# Patient Record
Sex: FEMALE | Race: WHITE | NOT HISPANIC OR LATINO | URBAN - METROPOLITAN AREA
[De-identification: names, ages, dates, MRNs, and addresses within clinical notes are randomized per-mention and may not be internally consistent; named-entity substitution may affect disease eponyms.]

---

## 2023-03-27 ENCOUNTER — LAB OUTSIDE AN ENCOUNTER (OUTPATIENT)
Dept: URBAN - METROPOLITAN AREA CLINIC 113 | Facility: CLINIC | Age: 28
End: 2023-03-27

## 2023-03-27 ENCOUNTER — OFFICE VISIT (OUTPATIENT)
Dept: URBAN - METROPOLITAN AREA CLINIC 113 | Facility: CLINIC | Age: 28
End: 2023-03-27
Payer: COMMERCIAL

## 2023-03-27 VITALS
SYSTOLIC BLOOD PRESSURE: 106 MMHG | HEART RATE: 69 BPM | DIASTOLIC BLOOD PRESSURE: 68 MMHG | WEIGHT: 154 LBS | RESPIRATION RATE: 18 BRPM | TEMPERATURE: 98 F

## 2023-03-27 DIAGNOSIS — R10.12 LEFT UPPER QUADRANT ABDOMINAL PAIN: ICD-10-CM

## 2023-03-27 DIAGNOSIS — K21.9 GERD: ICD-10-CM

## 2023-03-27 DIAGNOSIS — R19.4 CHANGE IN BOWEL HABITS: ICD-10-CM

## 2023-03-27 DIAGNOSIS — R13.10 DYSPHAGIA: ICD-10-CM

## 2023-03-27 PROCEDURE — 99204 OFFICE O/P NEW MOD 45 MIN: CPT | Performed by: INTERNAL MEDICINE

## 2023-03-27 RX ORDER — DICYCLOMINE HYDROCHLORIDE 20 MG/1
1 TABLET TABLET ORAL
Qty: 45 | Refills: 3 | OUTPATIENT
Start: 2023-03-27 | End: 2023-07-25

## 2023-03-27 NOTE — PHYSICAL EXAM SKIN:
no rashes , no suspicious lesions , no areas of discoloration , no jaundice present , good turgor , no masses , no tenderness on palpation 16-Mar-2023 03:43

## 2023-03-27 NOTE — HPI-TODAY'S VISIT:
Very pleasant 27-year-old female presents with a history of intermittent significant stabbing left upper quadrant abdominal discomfort that does not radiate.  She also has occasional episodes of esophageal chest pain.  Associate with dysphagia.  To solids and liquids.  Occasional mild heartburn.  Tums and Pepcid has been effective in controlling her symptoms.  Because of her symptoms she underwent a cholecystectomy at the [facility] about January 2023.  Unfortunately after removal of her gallbladder she has had persistent symptoms.  She did have a barium swallow done at the [facility] which was reportedly negative.  She is not having any nausea and vomiting at this time.  She had some diarrhea for a period of time after surgery but now this seems to be getting better.  1-2 stools per day.  No rectal bleeding or melena.  Interestingly, she is not having issues with gas or bloating at this time.  She did have gassiness in the past with omeprazole therapy. . Barium esophagram February 2023.  Unremarkable. . CT scan neck soft tissue without IV contrast.  Negative. . Brain MRI with IV contrast.  Normal January 2023. . Cholecystectomy January 2023.  Pathology notable for cholelithiasis and chronic cholecystitis. . Labs January 2023.  Normal liver test.  ALT 9.,  Hemoglobin 11.4.  MCV 88.  Platelet count 200,000. . EGD 2012.  Performed due to hematemesis related to opiate induced nausea vomiting.  She was taken opiates because of kidney stones.  This upper endoscopy was negative.

## 2023-05-05 ENCOUNTER — TELEPHONE ENCOUNTER (OUTPATIENT)
Dept: URBAN - METROPOLITAN AREA CLINIC 113 | Facility: CLINIC | Age: 28
End: 2023-05-05

## 2023-05-05 ENCOUNTER — WEB ENCOUNTER (OUTPATIENT)
Dept: URBAN - METROPOLITAN AREA SURGERY CENTER 25 | Facility: SURGERY CENTER | Age: 28
End: 2023-05-05

## 2023-05-05 ENCOUNTER — OFFICE VISIT (OUTPATIENT)
Dept: URBAN - METROPOLITAN AREA SURGERY CENTER 25 | Facility: SURGERY CENTER | Age: 28
End: 2023-05-05
Payer: COMMERCIAL

## 2023-05-05 DIAGNOSIS — K21.00 ALKALINE REFLUX ESOPHAGITIS: ICD-10-CM

## 2023-05-05 DIAGNOSIS — K29.60 ADENOPAPILLOMATOSIS GASTRICA: ICD-10-CM

## 2023-05-05 PROCEDURE — G8907 PT DOC NO EVENTS ON DISCHARG: HCPCS | Performed by: INTERNAL MEDICINE

## 2023-05-05 PROCEDURE — 43239 EGD BIOPSY SINGLE/MULTIPLE: CPT | Performed by: INTERNAL MEDICINE

## 2023-05-05 RX ORDER — FAMOTIDINE 40 MG/1
1 TABLET AT BEDTIME TABLET, FILM COATED ORAL ONCE A DAY
Qty: 90 | Refills: 3 | OUTPATIENT
Start: 2023-05-05

## 2023-05-05 RX ORDER — DICYCLOMINE HYDROCHLORIDE 20 MG/1
1 TABLET TABLET ORAL
Qty: 45 | Refills: 3 | Status: ACTIVE | COMMUNITY
Start: 2023-03-27 | End: 2023-07-25

## 2023-05-05 RX ORDER — PANTOPRAZOLE SODIUM 40 MG/1
1 TABLET TABLET, DELAYED RELEASE ORAL ONCE A DAY
Qty: 90 | Refills: 3 | OUTPATIENT
Start: 2023-05-05

## 2023-05-08 ENCOUNTER — TELEPHONE ENCOUNTER (OUTPATIENT)
Dept: URBAN - METROPOLITAN AREA CLINIC 113 | Facility: CLINIC | Age: 28
End: 2023-05-08

## 2023-06-27 ENCOUNTER — LAB OUTSIDE AN ENCOUNTER (OUTPATIENT)
Dept: URBAN - METROPOLITAN AREA CLINIC 113 | Facility: CLINIC | Age: 28
End: 2023-06-27

## 2023-06-27 ENCOUNTER — OFFICE VISIT (OUTPATIENT)
Dept: URBAN - METROPOLITAN AREA CLINIC 113 | Facility: CLINIC | Age: 28
End: 2023-06-27
Payer: COMMERCIAL

## 2023-06-27 VITALS
DIASTOLIC BLOOD PRESSURE: 59 MMHG | HEIGHT: 64 IN | SYSTOLIC BLOOD PRESSURE: 109 MMHG | HEART RATE: 107 BPM | BODY MASS INDEX: 26.63 KG/M2 | WEIGHT: 156 LBS | RESPIRATION RATE: 14 BRPM | TEMPERATURE: 97.8 F

## 2023-06-27 DIAGNOSIS — R10.12 LEFT UPPER QUADRANT ABDOMINAL PAIN: ICD-10-CM

## 2023-06-27 DIAGNOSIS — K31.84 GASTROPARESIS: ICD-10-CM

## 2023-06-27 DIAGNOSIS — K59.01 SLOW TRANSIT CONSTIPATION: ICD-10-CM

## 2023-06-27 DIAGNOSIS — R19.4 CHANGE IN BOWEL HABITS: ICD-10-CM

## 2023-06-27 DIAGNOSIS — K21.00 GASTROESOPHAGEAL REFLUX DISEASE WITH ESOPHAGITIS WITHOUT HEMORRHAGE: ICD-10-CM

## 2023-06-27 PROCEDURE — 99214 OFFICE O/P EST MOD 30 MIN: CPT | Performed by: NURSE PRACTITIONER

## 2023-06-27 RX ORDER — PANTOPRAZOLE SODIUM 40 MG/1
1 TABLET TABLET, DELAYED RELEASE ORAL ONCE A DAY
Qty: 90 | Refills: 3 | Status: ON HOLD | COMMUNITY
Start: 2023-05-05

## 2023-06-27 RX ORDER — DICYCLOMINE HYDROCHLORIDE 20 MG/1
1 TABLET TABLET ORAL
Qty: 45 | Refills: 3 | Status: ACTIVE | COMMUNITY
Start: 2023-03-27 | End: 2023-07-25

## 2023-06-27 RX ORDER — PRUCALOPRIDE 2 MG/1
1 TABLET TABLET, FILM COATED ORAL ONCE A DAY
Qty: 90 TABLET | Refills: 3 | OUTPATIENT
Start: 2023-06-27 | End: 2024-06-21

## 2023-06-27 RX ORDER — FAMOTIDINE 40 MG/1
1 TABLET AT BEDTIME TABLET, FILM COATED ORAL ONCE A DAY
Qty: 90 | Refills: 3 | Status: ON HOLD | COMMUNITY
Start: 2023-05-05

## 2023-06-27 NOTE — HPI-TODAY'S VISIT:
26yo female presenting for follow-up of abdominal pain. She was seen in the office in March for evaluation of abdominal pain and intermittent reflux symptoms/atypical chest pain, suspected to be related to functional dyspepsia. An EGD with possible esophageal dilation was planned, with consideration for initiation of acid suppressive therapy. Dicyclomine was provided to use as needed. EGD 5/5/23: moderate retained gastric contents suggestive of gastroparesis, two salmon-colored mucosal tongues noted from 37-38cm, erosive esophagitis class A limited to the GEJ, normal distal esophagus s/p biopsies, minimal antral erythema. Biopsies of the lower third of the esophagus and GE junction showed mild reflux esophagitis, negative for intestinal metaplasia or EoE. Antral biopsies showed reactive gastropathy, negative for H pylori.   She was recommended a trial of pantoprazole each morning and famotidine prior to bedtime, but never started the medication. She also has not tried the dicyclomine, as she had many questions about the medications. She reports a history of autoimmune disease, which was previously being worked up in [place]. She was misdiagnosed with lupus for a period of time, but is no longer under the care of a rheumatologist. She only has one functioning kidney, stating she experienced left kidney failure following a prolonged course of IV antibiotics at the age of 16 while in the . She has some calcification of the right kidney which leads to recurrent kidney stones. 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 She was previously following with a nephrologist at [facility], but was just being monitored.  Her symptoms are largely unchanged. She continues to experience intermittent exacerbations of upper abdominal pain, which is worsened in the left upper quadrant. This may be worsened on an empty stomach or after eating. She reports associated abdominal bloating and constipation. She has infrequent nausea without vomiting. She is having a bowel movement every 4 days with use of prune juice. She has tried fiber, miralax, suppositories, and stool softeners without relief. Since the time of her last visit, she has been modifying her diet in an effort to lessen her abdominal pain. She is consuming smaller meals, has increased her fluid intake, and is avoiding alcohol.

## 2023-06-27 NOTE — HPI-OTHER HISTORIES
Barium esophagram February 2023.  Unremarkable. . CT scan neck soft tissue without IV contrast.  Negative. . Brain MRI with IV contrast.  Normal January 2023. . Cholecystectomy January 2023.  Pathology notable for cholelithiasis and chronic cholecystitis. . Labs January 2023.  Normal liver test.  ALT 9.,  Hemoglobin 11.4.  MCV 88.  Platelet count 200,000. . EGD 2012.  Performed due to hematemesis related to opiate induced nausea vomiting.  She was taken opiates because of kidney stones.  This upper endoscopy was negative.

## 2023-06-28 ENCOUNTER — TELEPHONE ENCOUNTER (OUTPATIENT)
Dept: URBAN - METROPOLITAN AREA CLINIC 113 | Facility: CLINIC | Age: 28
End: 2023-06-28

## 2023-06-28 LAB
A/G RATIO: 1.8
ABSOLUTE BASOPHILS: 32
ABSOLUTE EOSINOPHILS: 101
ABSOLUTE LYMPHOCYTES: 1458
ABSOLUTE MONOCYTES: 302
ABSOLUTE NEUTROPHILS: 3408
ALBUMIN: 4.9
ALKALINE PHOSPHATASE: 82
ALT (SGPT): 11
AST (SGOT): 15
BASOPHILS: 0.6
BILIRUBIN, TOTAL: 0.5
BUN/CREATININE RATIO: (no result)
BUN: 9
CALCIUM: 9.8
CARBON DIOXIDE, TOTAL: 26
CHLORIDE: 104
CREATININE: 0.87
EGFR: 92
EOSINOPHILS: 1.9
GLOBULIN, TOTAL: 2.8
GLUCOSE: 80
HEMATOCRIT: 41.8
HEMOGLOBIN: 13.1
LIPASE: 29
LYMPHOCYTES: 27.5
MCH: 27.5
MCHC: 31.3
MCV: 87.6
MONOCYTES: 5.7
MPV: 10.4
NEUTROPHILS: 64.3
PLATELET COUNT: 269
POTASSIUM: 4
PROTEIN, TOTAL: 7.7
RDW: 12.2
RED BLOOD CELL COUNT: 4.77
SODIUM: 138
TSH W/REFLEX TO FT4: 1.76
WHITE BLOOD CELL COUNT: 5.3

## 2023-06-30 ENCOUNTER — TELEPHONE ENCOUNTER (OUTPATIENT)
Dept: URBAN - METROPOLITAN AREA CLINIC 113 | Facility: CLINIC | Age: 28
End: 2023-06-30

## 2023-07-19 ENCOUNTER — TELEPHONE ENCOUNTER (OUTPATIENT)
Dept: URBAN - METROPOLITAN AREA CLINIC 113 | Facility: CLINIC | Age: 28
End: 2023-07-19

## 2023-09-28 ENCOUNTER — OFFICE VISIT (OUTPATIENT)
Dept: URBAN - METROPOLITAN AREA CLINIC 113 | Facility: CLINIC | Age: 28
End: 2023-09-28

## 2023-11-16 ENCOUNTER — OFFICE VISIT (OUTPATIENT)
Dept: URBAN - METROPOLITAN AREA CLINIC 113 | Facility: CLINIC | Age: 28
End: 2023-11-16
Payer: COMMERCIAL

## 2023-11-16 VITALS
RESPIRATION RATE: 15 BRPM | HEIGHT: 64 IN | SYSTOLIC BLOOD PRESSURE: 123 MMHG | TEMPERATURE: 98.2 F | BODY MASS INDEX: 26.46 KG/M2 | HEART RATE: 61 BPM | WEIGHT: 155 LBS | DIASTOLIC BLOOD PRESSURE: 60 MMHG

## 2023-11-16 DIAGNOSIS — K59.01 SLOW TRANSIT CONSTIPATION: ICD-10-CM

## 2023-11-16 DIAGNOSIS — R10.12 LEFT UPPER QUADRANT ABDOMINAL PAIN: ICD-10-CM

## 2023-11-16 DIAGNOSIS — K31.84 GASTROPARESIS: ICD-10-CM

## 2023-11-16 DIAGNOSIS — K21.00 GASTROESOPHAGEAL REFLUX DISEASE WITH ESOPHAGITIS WITHOUT HEMORRHAGE: ICD-10-CM

## 2023-11-16 DIAGNOSIS — R19.4 CHANGE IN BOWEL HABITS: ICD-10-CM

## 2023-11-16 DIAGNOSIS — K21.9 GERD: ICD-10-CM

## 2023-11-16 DIAGNOSIS — R13.10 DYSPHAGIA: ICD-10-CM

## 2023-11-16 PROCEDURE — 99214 OFFICE O/P EST MOD 30 MIN: CPT | Performed by: INTERNAL MEDICINE

## 2023-11-16 NOTE — HPI-OTHER HISTORIES
CT neck soft tissue: Oct 2023. Mildly prominent cervical nodes. O/w normal.   CT abd and pelvis without contrast: July 2023. No acute findings  Motegrity: July 2023. No response to medication. Caused severe nausea and vomiting.  EGD 5/5/23: moderate retained gastric contents suggestive of gastroparesis, two salmon-colored mucosal tongues noted from 37-38cm, erosive esophagitis class A limited to the GEJ, normal distal esophagus s/p biopsies, minimal antral erythema. Biopsies of the lower third of the esophagus and GE junction showed mild reflux esophagitis, negative for intestinal metaplasia or EoE. Antral biopsies showed reactive gastropathy, negative for H pylori.  April 2023. Negative celiac serology  Barium esophagram February 2023.  Unremarkable. . CT scan neck soft tissue without IV contrast.  Negative. . Brain MRI with IV contrast.  Normal January 2023. . Cholecystectomy January 2023.  Pathology notable for cholelithiasis and chronic cholecystitis. . Labs January 2023.  Normal liver test.  ALT 9.,  Hemoglobin 11.4.  MCV 88.  Platelet count 200,000. . EGD 2012.  Performed due to hematemesis related to opiate induced nausea vomiting.  She was taken opiates because of kidney stones.  This upper endoscopy was negative.

## 2023-11-16 NOTE — HPI-TODAY'S VISIT:
27 yo female presenting for follow-up of abdominal pain.  She reports a history of autoimmune disease, which was previously being worked up in [place]. She was misdiagnosed with lupus for a period of time and is no longer under the care of a rheumatologist.   She only has one functioning kidney, stating she experienced left kidney failure following a prolonged course of IV antibiotics at the age of 16 while in the . She has some calcification of the right kidney which leads to recurrent kidney stones. 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 Hx of kidney trauma at age 16. She was previously following with a nephrologist at [facility], but was just being monitored. She states that her symptoms are better with a gastroparesis diet.  She is eating smaller more frequent meals.  We discussed previous imaging which was reassuring.  She has not had a gastric emptying study.  She is off of PPI therapy.  Not complaining of dysphagia heartburn or regurgitation at this time.  Appetite is fine.  Weight is stable.  Abdominal pain is clearly better.  No reports of nausea or vomiting.

## 2023-11-18 ENCOUNTER — DASHBOARD ENCOUNTERS (OUTPATIENT)
Age: 28
End: 2023-11-18

## 2024-06-11 ENCOUNTER — OFFICE VISIT (OUTPATIENT)
Dept: URBAN - METROPOLITAN AREA CLINIC 113 | Facility: CLINIC | Age: 29
End: 2024-06-11
Payer: COMMERCIAL

## 2024-06-11 ENCOUNTER — LAB OUTSIDE AN ENCOUNTER (OUTPATIENT)
Dept: URBAN - METROPOLITAN AREA CLINIC 113 | Facility: CLINIC | Age: 29
End: 2024-06-11

## 2024-06-11 VITALS
RESPIRATION RATE: 18 BRPM | WEIGHT: 148 LBS | HEIGHT: 64 IN | DIASTOLIC BLOOD PRESSURE: 50 MMHG | BODY MASS INDEX: 25.27 KG/M2 | TEMPERATURE: 98.1 F | SYSTOLIC BLOOD PRESSURE: 93 MMHG | HEART RATE: 68 BPM

## 2024-06-11 DIAGNOSIS — R10.84 GENERALIZED ABDOMINAL PAIN: ICD-10-CM

## 2024-06-11 DIAGNOSIS — K59.00 CONSTIPATION, UNSPECIFIED CONSTIPATION TYPE: ICD-10-CM

## 2024-06-11 DIAGNOSIS — R19.4 CHANGE IN BOWEL HABITS: ICD-10-CM

## 2024-06-11 PROCEDURE — 99214 OFFICE O/P EST MOD 30 MIN: CPT | Performed by: NURSE PRACTITIONER

## 2024-06-11 NOTE — HPI-OTHER HISTORIES
CT neck soft tissue: Oct 2023. Mildly prominent cervical nodes. O/w normal.   CT abd and pelvis without contrast: July 2023. No acute findings  Motegrity: July 2023. No response to medication. Caused severe nausea and vomiting, muscle aches.  EGD 5/5/23: moderate retained gastric contents suggestive of gastroparesis, two salmon-colored mucosal tongues noted from 37-38cm, erosive esophagitis class A limited to the GEJ, normal distal esophagus s/p biopsies, minimal antral erythema. Biopsies of the lower third of the esophagus and GE junction showed mild reflux esophagitis, negative for intestinal metaplasia or EoE. Antral biopsies showed reactive gastropathy, negative for H pylori.  April 2023. Negative celiac serology  Barium esophagram February 2023.  Unremarkable.  CT scan neck soft tissue without IV contrast.  Negative.  Brain MRI with IV contrast.  Normal January 2023.  Cholecystectomy January 2023.  Pathology notable for cholelithiasis and chronic cholecystitis.  Labs January 2023.  Normal liver test.  ALT 9.,  Hemoglobin 11.4.  MCV 88.  Platelet count 200,000.  EGD 2012.  Performed due to hematemesis related to opiate induced nausea vomiting.  She was taken opiates because of kidney stones.  This upper endoscopy was negative.  She reports a history of autoimmune disease, which was previously being worked up in [place]. She was misdiagnosed with lupus for a period of time, but is no longer under the care of a rheumatologist. She only has one functioning kidney, stating she experienced left kidney failure following a prolonged course of IV antibiotics at the age of 16 while in the . She has some calcification of the right kidney which leads to recurrent kidney stones. 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 She was previously following with a nephrologist at [facility], but was just being monitored.

## 2024-06-11 NOTE — HPI-TODAY'S VISIT:
28yo female presenting for follow-up of abdominal pain.  She was seen in the office in November for follow-up regarding constipation, abdominal pain, reflux and gastroparesis. Her symptoms were overall managed with diet. She had been unable to tolerate Motegrity.  Unfortunately, her symptoms have recurred over the last several weeks. She complains of persistent abdominal pain, worse on the left side, since completing 4 rounds of antibiotics for an ear infection. The abdominal pain is worsened following meals. She reports associated constipation, going 4-5 days between bowel movements despite use of prune juice and magnesium supplementation. She denies any blood in the stool. She is hesitant to utilize other medical therapies d/t side effects in the past. MiraLAX has previously not been helpful. She denies nausea or vomiting.

## 2024-09-12 ENCOUNTER — OFFICE VISIT (OUTPATIENT)
Dept: URBAN - METROPOLITAN AREA CLINIC 113 | Facility: CLINIC | Age: 29
End: 2024-09-12

## 2025-08-19 ENCOUNTER — OFFICE VISIT (OUTPATIENT)
Dept: URBAN - METROPOLITAN AREA CLINIC 113 | Facility: CLINIC | Age: 30
End: 2025-08-19
Payer: COMMERCIAL

## 2025-08-19 DIAGNOSIS — R19.4 CHANGE IN BOWEL HABITS: ICD-10-CM

## 2025-08-19 DIAGNOSIS — R14.0 ABDOMINAL BLOATING: ICD-10-CM

## 2025-08-19 DIAGNOSIS — K59.04 CHRONIC IDIOPATHIC CONSTIPATION: ICD-10-CM

## 2025-08-19 DIAGNOSIS — K63.89 SMALL INTESTINAL BACTERIAL OVERGROWTH: ICD-10-CM

## 2025-08-19 PROCEDURE — 99214 OFFICE O/P EST MOD 30 MIN: CPT | Performed by: NURSE PRACTITIONER

## 2025-08-19 RX ORDER — METRONIDAZOLE 500 MG/1
1 TABLET TABLET ORAL THREE TIMES A DAY
Qty: 30 TABLET | Refills: 0 | OUTPATIENT
Start: 2025-08-19 | End: 2025-08-29

## 2025-08-19 RX ORDER — PLECANATIDE 3 MG/1
1 TABLET TABLET ORAL ONCE A DAY
Qty: 90 TABLET | Refills: 3 | OUTPATIENT
Start: 2025-08-19 | End: 2026-08-14

## 2025-08-20 LAB
(TTG) AB, IGA: <1
(TTG) AB, IGG: <1
ANTIGLIADIN ABS, IGA: <1
GLIADIN (DEAMIDATED) AB (IGG): <1
IMMUNOGLOBULIN A: 110